# Patient Record
Sex: FEMALE | Race: BLACK OR AFRICAN AMERICAN | Employment: UNEMPLOYED | URBAN - METROPOLITAN AREA
[De-identification: names, ages, dates, MRNs, and addresses within clinical notes are randomized per-mention and may not be internally consistent; named-entity substitution may affect disease eponyms.]

---

## 2017-05-28 ENCOUNTER — APPOINTMENT (OUTPATIENT)
Dept: GENERAL RADIOLOGY | Age: 62
End: 2017-05-28
Attending: PHYSICIAN ASSISTANT
Payer: SELF-PAY

## 2017-05-28 ENCOUNTER — HOSPITAL ENCOUNTER (EMERGENCY)
Age: 62
Discharge: HOME | End: 2017-05-28
Attending: EMERGENCY MEDICINE
Payer: SELF-PAY

## 2017-05-28 VITALS
WEIGHT: 247 LBS | HEIGHT: 63 IN | HEART RATE: 98 BPM | RESPIRATION RATE: 20 BRPM | TEMPERATURE: 97.8 F | SYSTOLIC BLOOD PRESSURE: 99 MMHG | DIASTOLIC BLOOD PRESSURE: 46 MMHG | OXYGEN SATURATION: 99 % | BODY MASS INDEX: 43.77 KG/M2

## 2017-05-28 DIAGNOSIS — J44.1 ACUTE EXACERBATION OF CHRONIC OBSTRUCTIVE PULMONARY DISEASE (COPD) (HCC): Primary | ICD-10-CM

## 2017-05-28 LAB
ALBUMIN SERPL BCP-MCNC: 3.6 G/DL (ref 3.5–5)
ALBUMIN/GLOB SERPL: 0.9 {RATIO} (ref 1.1–2.2)
ALP SERPL-CCNC: 70 U/L (ref 45–117)
ALT SERPL-CCNC: 20 U/L (ref 12–78)
ANION GAP BLD CALC-SCNC: 9 MMOL/L (ref 5–15)
AST SERPL W P-5'-P-CCNC: 21 U/L (ref 15–37)
BASOPHILS # BLD AUTO: 0.1 K/UL (ref 0–0.1)
BASOPHILS # BLD: 1 % (ref 0–1)
BILIRUB SERPL-MCNC: 0.3 MG/DL (ref 0.2–1)
BNP SERPL-MCNC: 213 PG/ML (ref 0–125)
BUN SERPL-MCNC: 25 MG/DL (ref 6–20)
BUN/CREAT SERPL: 18 (ref 12–20)
CALCIUM SERPL-MCNC: 9 MG/DL (ref 8.5–10.1)
CHLORIDE SERPL-SCNC: 104 MMOL/L (ref 97–108)
CK MB CFR SERPL CALC: 0.3 % (ref 0–2.5)
CK MB SERPL-MCNC: 1.1 NG/ML (ref 5–25)
CK SERPL-CCNC: 361 U/L (ref 26–192)
CO2 SERPL-SCNC: 29 MMOL/L (ref 21–32)
CREAT SERPL-MCNC: 1.4 MG/DL (ref 0.55–1.02)
EOSINOPHIL # BLD: 0.3 K/UL (ref 0–0.4)
EOSINOPHIL NFR BLD: 3 % (ref 0–7)
ERYTHROCYTE [DISTWIDTH] IN BLOOD BY AUTOMATED COUNT: 13.1 % (ref 11.5–14.5)
GLOBULIN SER CALC-MCNC: 3.8 G/DL (ref 2–4)
GLUCOSE SERPL-MCNC: 84 MG/DL (ref 65–100)
HCT VFR BLD AUTO: 37.7 % (ref 35–47)
HGB BLD-MCNC: 11.9 G/DL (ref 11.5–16)
LYMPHOCYTES # BLD AUTO: 21 % (ref 12–49)
LYMPHOCYTES # BLD: 2 K/UL (ref 0.8–3.5)
MCH RBC QN AUTO: 28.4 PG (ref 26–34)
MCHC RBC AUTO-ENTMCNC: 31.6 G/DL (ref 30–36.5)
MCV RBC AUTO: 90 FL (ref 80–99)
MONOCYTES # BLD: 0.5 K/UL (ref 0–1)
MONOCYTES NFR BLD AUTO: 6 % (ref 5–13)
NEUTS SEG # BLD: 6.7 K/UL (ref 1.8–8)
NEUTS SEG NFR BLD AUTO: 69 % (ref 32–75)
PLATELET # BLD AUTO: 350 K/UL (ref 150–400)
POTASSIUM SERPL-SCNC: 3.4 MMOL/L (ref 3.5–5.1)
PROT SERPL-MCNC: 7.4 G/DL (ref 6.4–8.2)
RBC # BLD AUTO: 4.19 M/UL (ref 3.8–5.2)
SODIUM SERPL-SCNC: 142 MMOL/L (ref 136–145)
TROPONIN I SERPL-MCNC: <0.04 NG/ML
WBC # BLD AUTO: 9.5 K/UL (ref 3.6–11)

## 2017-05-28 PROCEDURE — 71020 XR CHEST PA LAT: CPT

## 2017-05-28 PROCEDURE — 74011000250 HC RX REV CODE- 250: Performed by: PHYSICIAN ASSISTANT

## 2017-05-28 PROCEDURE — 74011250636 HC RX REV CODE- 250/636: Performed by: PHYSICIAN ASSISTANT

## 2017-05-28 PROCEDURE — 85025 COMPLETE CBC W/AUTO DIFF WBC: CPT | Performed by: PHYSICIAN ASSISTANT

## 2017-05-28 PROCEDURE — 77030029684 HC NEB SM VOL KT MONA -A

## 2017-05-28 PROCEDURE — 83880 ASSAY OF NATRIURETIC PEPTIDE: CPT | Performed by: PHYSICIAN ASSISTANT

## 2017-05-28 PROCEDURE — 74011250637 HC RX REV CODE- 250/637: Performed by: PHYSICIAN ASSISTANT

## 2017-05-28 PROCEDURE — 36415 COLL VENOUS BLD VENIPUNCTURE: CPT | Performed by: PHYSICIAN ASSISTANT

## 2017-05-28 PROCEDURE — 99285 EMERGENCY DEPT VISIT HI MDM: CPT

## 2017-05-28 PROCEDURE — 94640 AIRWAY INHALATION TREATMENT: CPT

## 2017-05-28 PROCEDURE — 82550 ASSAY OF CK (CPK): CPT | Performed by: PHYSICIAN ASSISTANT

## 2017-05-28 PROCEDURE — 80053 COMPREHEN METABOLIC PANEL: CPT | Performed by: PHYSICIAN ASSISTANT

## 2017-05-28 PROCEDURE — 96374 THER/PROPH/DIAG INJ IV PUSH: CPT

## 2017-05-28 PROCEDURE — 84484 ASSAY OF TROPONIN QUANT: CPT | Performed by: PHYSICIAN ASSISTANT

## 2017-05-28 PROCEDURE — 93005 ELECTROCARDIOGRAM TRACING: CPT

## 2017-05-28 RX ORDER — SODIUM CHLORIDE 0.9 % (FLUSH) 0.9 %
5-10 SYRINGE (ML) INJECTION AS NEEDED
Status: DISCONTINUED | OUTPATIENT
Start: 2017-05-28 | End: 2017-05-29 | Stop reason: HOSPADM

## 2017-05-28 RX ORDER — IPRATROPIUM BROMIDE AND ALBUTEROL SULFATE 2.5; .5 MG/3ML; MG/3ML
3 SOLUTION RESPIRATORY (INHALATION)
Status: COMPLETED | OUTPATIENT
Start: 2017-05-28 | End: 2017-05-28

## 2017-05-28 RX ORDER — DEXAMETHASONE SODIUM PHOSPHATE 100 MG/10ML
10 INJECTION INTRAMUSCULAR; INTRAVENOUS ONCE
Status: COMPLETED | OUTPATIENT
Start: 2017-05-28 | End: 2017-05-28

## 2017-05-28 RX ORDER — ALBUTEROL SULFATE 90 UG/1
2 AEROSOL, METERED RESPIRATORY (INHALATION)
Qty: 1 INHALER | Refills: 0 | Status: SHIPPED | OUTPATIENT
Start: 2017-05-28

## 2017-05-28 RX ORDER — SODIUM CHLORIDE 0.9 % (FLUSH) 0.9 %
5-10 SYRINGE (ML) INJECTION EVERY 8 HOURS
Status: DISCONTINUED | OUTPATIENT
Start: 2017-05-28 | End: 2017-05-29 | Stop reason: HOSPADM

## 2017-05-28 RX ORDER — POTASSIUM CHLORIDE 750 MG/1
20 TABLET, FILM COATED, EXTENDED RELEASE ORAL
Status: COMPLETED | OUTPATIENT
Start: 2017-05-28 | End: 2017-05-28

## 2017-05-28 RX ORDER — PREDNISONE 10 MG/1
TABLET ORAL
Qty: 21 TAB | Refills: 0 | Status: SHIPPED | OUTPATIENT
Start: 2017-05-28

## 2017-05-28 RX ADMIN — Medication 10 ML: at 18:44

## 2017-05-28 RX ADMIN — POTASSIUM CHLORIDE 20 MEQ: 750 TABLET, FILM COATED, EXTENDED RELEASE ORAL at 20:02

## 2017-05-28 RX ADMIN — IPRATROPIUM BROMIDE AND ALBUTEROL SULFATE 3 ML: .5; 3 SOLUTION RESPIRATORY (INHALATION) at 18:36

## 2017-05-28 RX ADMIN — IPRATROPIUM BROMIDE AND ALBUTEROL SULFATE 3 ML: .5; 3 SOLUTION RESPIRATORY (INHALATION) at 19:43

## 2017-05-28 RX ADMIN — DEXAMETHASONE SODIUM PHOSPHATE 10 MG: 10 INJECTION INTRAMUSCULAR; INTRAVENOUS at 18:41

## 2017-05-28 NOTE — ED NOTES
Pt resting in bed w/ family at bedside, pt appears to be in no distress, will continue to monitor pt.

## 2017-05-28 NOTE — ED NOTES
Per pt reports shortness of breath that started at 2:30 am today, sitting up makes the pain better, hx of COPD, EMS placed pt on 2 L NC, pt was mid 90's on RA, denies chest pain. Intermittent non productive cough x 1 week, denies abdominal pain, N/V/D, fever, chills. Anterior right lower leg bruising due to falling a week ago, pt reports \"I was walking up stairs. \"

## 2017-05-28 NOTE — ED PROVIDER NOTES
HPI Comments: Nakia Bryan is a 62 y.o. female w/ hx significant for COPD, HTN, panic attacks, and arthritis who presents via EMS on 2L oxygen by nasal canula to the ED c/o shortness of breath since 2:30 AM this morning. States she was lying in bed when the SOB hit her suddenly. States it has been intermittent throughout the day. Pt additionally endorses a dry cough x 1 week. Pt also states she tripped walking up the stairs about a week ago and hitting the anterior aspect of her Rt leg. Pt endorses she had a visit with her PCP last week and was told everything was normal. Pt states she has a history of dyspnea on exertion as well as orthopnea. Pt specifically denies fever, chills, nasal congestion, ear pain, sore throat, headache, chest pain, n/v, abd pain, diarrhea, constipation, urinary sxs. PCP: Dianne Courtney MD    There are no other complaints, changes or physical findings at this time. Patient is a 62 y.o. female presenting with shortness of breath. The history is provided by the patient. Shortness of Breath   This is a new problem. The average episode lasts 14 hours. The problem occurs intermittently. The current episode started 12 to 24 hours ago. The problem has not changed since onset. Associated symptoms include cough and leg pain. Pertinent negatives include no fever, no headaches, no coryza, no rhinorrhea, no sore throat, no swollen glands, no ear pain, no neck pain, no sputum production, no hemoptysis, no wheezing, no PND, no orthopnea, no chest pain, no syncope, no vomiting, no abdominal pain, no rash, no leg swelling and no claudication. Risk factors include a recent leg injury. She has tried ipratropium inhalers and beta-agonist inhalers for the symptoms. She has had no prior hospitalizations. She has had prior ED visits. She has had no prior ICU admissions. Associated medical issues include COPD. Associated medical issues do not include chronic lung disease.         Past Medical History:   Diagnosis Date    Arthritis     Chronic obstructive pulmonary disease ([facility] [address]     Hypertension     Psychiatric disorder     Panic attacks       Past Surgical History:   Procedure Laterality Date    HX GYN               Family History:   Problem Relation Age of Onset    Hypertension Mother    [employer] Ele[employer]ated [employer] Mother        Social History     Social History    Marital status: SINGLE     Spouse name: N/A    Number of children: N/A    Years of education: N/A     Occupational History    Not on file. Social History Main Topics    Smoking status: Current Every Day Smoker     Packs/day: 0.50     Years: 25.00    Smokeless tobacco: Never Used    Alcohol use No      Comment: special occasions    Drug use: No    Sexual activity: Not Currently     Other Topics Concern    Not on file     Social History Narrative         ALLERGIES: Review of patient's allergies indicates no known allergies. Review of Systems   Constitutional: Negative for activity change, chills, diaphoresis, fatigue and fever. HENT: Negative for congestion, ear pain, facial swelling, postnasal drip, rhinorrhea, sinus pressure, sore throat and voice change. Respiratory: Positive for cough and shortness of breath. Negative for hemoptysis, sputum production, chest tightness and wheezing. Cardiovascular: Negative for chest pain, palpitations, orthopnea, claudication, leg swelling, syncope and PND. Gastrointestinal: Negative for abdominal pain, constipation, diarrhea, nausea and vomiting. Genitourinary: Negative for dysuria, frequency and urgency. Musculoskeletal: Negative for back pain, gait problem, joint swelling and neck pain. Skin: Negative for color change, pallor, rash and wound. Neurological: Negative for dizziness, weakness and headaches. Psychiatric/Behavioral: Negative.         Vitals:    17 1755   BP: (!) 122/93   Pulse: 98   Resp: 20   Temp: 97.8 °F (36.6 °C)   SpO2: 100%   Weight: 112 kg (247 lb)   Height: 5' 3\" (1.6 m)            Physical Exam   Constitutional: She is oriented to person, place, and time. She appears well-developed and well-nourished. No distress. Pt laughing and talking in full sentences in room. HENT:   Head: Normocephalic and atraumatic. Right Ear: Hearing and external ear normal.   Left Ear: Hearing and external ear normal.   Nose: Nose normal.   Mouth/Throat: No oropharyngeal exudate. Eyes: Conjunctivae and EOM are normal. Pupils are equal, round, and reactive to light. Neck: Normal range of motion. Cardiovascular: Normal rate, regular rhythm, normal heart sounds and intact distal pulses. Exam reveals no gallop and no friction rub. No murmur heard. Pulmonary/Chest: Effort normal. No accessory muscle usage. No respiratory distress. She has no decreased breath sounds. She has no wheezes. She has no rhonchi. She has no rales. She exhibits no tenderness. Distant lung sounds. Abdominal: Soft. There is no tenderness. Musculoskeletal: Normal range of motion. Neurological: She is alert and oriented to person, place, and time. No cranial nerve deficit. Skin: Skin is warm and dry. Bruising (mild bruising to anterior Rt lower leg. Neg swelling, erthema, streaking, posterior TTP.) noted. She is not diaphoretic. Psychiatric: She has a normal mood and affect. Her behavior is normal. Judgment and thought content normal.   Nursing note and vitals reviewed.        MDM  Number of Diagnoses or Management Options  Acute exacerbation of chronic obstructive pulmonary disease (COPD) ([facility] [address]):   Diagnosis management comments: DDx: CHF, COPD exacerbation, pneumonia, PE    LABORATORY TESTS:  Recent Results (from the past 12 hour(s))  -EKG, 12 LEAD, INITIAL  Collection Time: 05/28/17  6:19 PM       Result                                            Value                         Ref Range                       Ventricular Rate                                  84 BPM                             Atrial Rate                                       84                            BPM                             P-R Interval                                      132                           ms                              QRS Duration                                      92                            ms                              Q-T Interval                                      404                           ms                              QTC Calculation (Bezet)                           477                           ms                              Calculated P Axis                                 57                            degrees                         Calculated R Axis                                 18                            degrees                         Calculated T Axis                                 36                            degrees                         Diagnosis                                                                                                   Normal sinus rhythm Normal ECG When compared with ECG of 22-DEC-2014 14:30, No significant change was found   -METABOLIC PANEL, COMPREHENSIVE  Collection Time: 05/28/17  6:43 PM       Result                                            Value                         Ref Range                       Sodium                                            142                           136 - 145 mmol/L                Potassium                                         3.4 (L)                       3.5 - 5.1 mmol/L                Chloride                                          104                           97 - 108 mmol/L                 CO2                                               29                            21 - 32 mmol/L                  Anion gap                                         9                             5 - 15 mmol/L                   Glucose 84                            65 - 100 mg/dL                  BUN                                               25 (H)                        6 - 20 MG/DL                    Creatinine                                        1.40 (H)                      0.55 - 1.02 MG/DL               BUN/Creatinine ratio                              18                            12 - 20                         GFR est AA                                        46 (L)                        >60 ml/min/1.73m2               GFR est non-AA                                    38 (L)                        >60 ml/min/1.73m2               Calcium                                           9.0                           8.5 - 10.1 MG/DL                Bilirubin, total                                  0.3                           0.2 - 1.0 MG/DL                 ALT (SGPT)                                        20                            12 - 78 U/L                     AST (SGOT)                                        21                            15 - 37 U/L                     Alk. phosphatase                                  70                            45 - 117 U/L                    Protein, total                                    7.4                           6.4 - 8.2 g/dL                  Albumin                                           3.6                           3.5 - 5.0 g/dL                  Globulin                                          3.8                           2.0 - 4.0 g/dL                  A-G Ratio                                         0.9 (L)                       1.1 - 2.2                  -CBC WITH AUTOMATED DIFF  Collection Time: 05/28/17  6:43 PM       Result                                            Value                         Ref Range                       WBC                                               9.5                           3.6 - 11.0 K/uL                 RBC 4.19                          3.80 - 5.20 M/uL                HGB                                               11.9                          11.5 - 16.0 g/dL                HCT                                               37.7                          35.0 - 47.0 %                   MCV                                               90.0                          80.0 - 99.0 FL                  MCH                                               28.4                          26.0 - 34.0 PG                  MCHC                                              31.6                          30.0 - 36.5 g/dL                RDW                                               13.1                          11.5 - 14.5 %                   PLATELET                                          350                           150 - 400 K/uL                  NEUTROPHILS                                       69                            32 - 75 %                       LYMPHOCYTES                                       21                            12 - 49 %                       MONOCYTES                                         6                             5 - 13 %                        EOSINOPHILS                                       3                             0 - 7 %                         BASOPHILS                                         1                             0 - 1 %                         ABS. NEUTROPHILS                                  6.7                           1.8 - 8.0 K/UL                  ABS. LYMPHOCYTES                                  2.0                           0.8 - 3.5 K/UL                  ABS. MONOCYTES                                    0.5                           0.0 - 1.0 K/UL                  ABS. EOSINOPHILS                                  0.3                           0.0 - 0.4 K/UL                  ABS.  BASOPHILS                                    0.1 0.0 - 0.1 K/UL             -TROPONIN I  Collection Time: 05/28/17  6:43 PM       Result                                            Value                         Ref Range                       Troponin-I, Qt.                                   <0.04                         <0.05 ng/mL                -CK W/ CKMB & INDEX  Collection Time: 05/28/17  6:43 PM       Result                                            Value                         Ref Range                       CK                                                361 (H)                       26 - 192 U/L                    CK - MB                                           1.1                           <3.6 NG/ML                      CK-MB Index                                       0.3                           0.0 - 2.5                  -PRO-BNP  Collection Time: 05/28/17  6:43 PM       Result                                            Value                         Ref Range                       NT pro-BNP                                        213 (H)                       0 - 125 PG/ML                IMAGING RESULTS:  XR CHEST PA LAT   Final Result   FINDINGS: PA and lateral view of the chest.   .  Lines/tubes/surgical: None. Heart/mediastinum: Unremarkable. Lungs/pleura: No focal consolidation or mass. No visualized pleural effusion or  pneumothorax. Additional Comments: Osteopenia and kyphosis with degenerative changes in the  spine. .  IMPRESSION  IMPRESSION:  1. No radiographic evidence of acute cardiopulmonary disease.     MEDICATIONS GIVEN:  Medications  sodium chloride (NS) flush 5-10 mL (not administered)  sodium chloride (NS) flush 5-10 mL (10 mL IntraVENous Given 5/28/17 1844)  albuterol-ipratropium (DUO-NEB) 2.5 MG-0.5 MG/3 ML (3 mL Nebulization Given 5/28/17 1836)  dexamethasone (DECADRON) injection 10 mg (10 mg IntraVENous Given 5/28/17 1841)  albuterol-ipratropium (DUO-NEB) 2.5 MG-0.5 MG/3 ML (3 mL Nebulization Given 5/28/17 1943)  potassium chloride SR (KLOR-CON 10) tablet 20 mEq (20 mEq Oral Given 5/28/17 2002)    IMPRESSION:  Acute exacerbation of chronic obstructive pulmonary disease (COPD) ([address]  (primary encounter diagnosis)    PLAN:  1. Current Discharge Medication List    START taking these medications    predniSONE (STERAPRED DS) 10 mg dose pack  See administration instruction per 10mg dose pack  Qty: 21 Tab Refills: 0      CONTINUE these medications which have CHANGED    albuterol (PROVENTIL HFA, VENTOLIN HFA, PROAIR HFA) 90 mcg/actuation inhaler   Take 2 Puffs by inhalation every four (4) hours as needed for Wheezing or Shortness of Breath. Indications: Chronic Obstructive Pulmonary Disease  Qty: 1 Inhaler Refills: 0      CONTINUE these medications which have NOT CHANGED    tiotropium (SPIRIVA WITH HANDIHALER) 18 mcg inhalation capsule  Take 1 Cap by inhalation daily. atorvastatin (LIPITOR) 20 mg tablet  Take 1 Tab by mouth nightly. Qty: 30 Tab Refills: 2    omeprazole (PRILOSEC) 20 mg capsule  Take 1 Cap by mouth as needed. Qty: 30 Cap Refills: 4  Associated Diagnoses:Gastroesophageal reflux disease without esophagitis    lisinopril-hydrochlorothiazide (PRINZIDE, ZESTORETIC) 10-12.5 mg per tablet  Take 1 Tab by mouth daily. Qty: 90 Tab Refills: 1  Associated Diagnoses:Essential hypertension        2. Follow-up Information     Follow up With Details Comments Contact Info    Not On File [facility] Schedule an appointment as soon as possible for a visit   in 1 week As needed, If symptoms worsen Not On File (62) Patient has a PCP   but that physician is not listed in [facility].         Return to ED if worse                  Amount and/or Complexity of Data Reviewed  Clinical lab tests: reviewed and ordered  Tests in the radiology section of CPT®: ordered and reviewed  Tests in the medicine section of CPT®: reviewed and ordered  Decide to obtain previous medical records or to obtain history from someone other than the patient: yes  Discuss the patient with other providers: yes (Attending, Dr. Suzi Nichols, updated on pt's hx, sxs, PE, vitals, and pending labs. He is in agreement with care plan. Attending, Dr. Luis Rome, was updated on pt's hx, sxs, vitals, PE, lab results and is agreement with care plan.)    Patient Progress  Patient progress: improved    ED Course       Procedures    EKG interpretation: (Preliminary)  Rhythm: normal sinus rhythm; and regular . Rate (approx.): 84bpm; Axis: normal; WI interval: normal; QRS interval: normal ; ST/T wave: normal; Other findings: unchanged from previous ekg.    7:29 PM  Pt doing well and resting comfortably. Endorses breathing has improved but still has some wheezing. Requesting another breathing treatment. 8:06 PM  Pt endorses feeling much better after receiving second duo-neb tx.     8:06 PM  I have discussed with patient their diagnosis, treatment, and follow up plan. The patient agrees to follow up as outlined in discharge paperwork and also to return to the ED with any worsening.  Taj Dean PA-C

## 2017-05-28 NOTE — ED NOTES
Bedside and Verbal shift change report given to CHRIS Lemus (oncoming nurse) by Yadira Klein (offgoing nurse). Report included the following information SBAR.

## 2017-05-29 NOTE — ED NOTES
Patient (s) was given copy of dc instructions and two paper script(s) and no electronic scripts. Patient (s) has verbalized understanding of instructions and script (s). Patient given a current medication reconciliation form and verbalized understanding of their medications. Patient (s) has verbalized understanding of the importance of discussing medications with  his or her physician or clinic they will be following up with. Patient alert and oriented and in no acute distress. Patient offered wheelchair from treatment area to hospital entrance, patient declined wheelchair. Patient left ED with family.

## 2017-05-29 NOTE — DISCHARGE INSTRUCTIONS
COPD Exacerbation Plan: Care Instructions  Your Care Instructions  If you have chronic obstructive pulmonary disease (COPD), your usual shortness of breath could suddenly get worse. You may start coughing more and have more mucus. This flare-up is called a COPD exacerbation (say \"ek-[id number]-ge-[id number]-dalton\"). A lung infection or air pollution could set off an exacerbation. Sometimes it can happen after a quick change in temperature or being around chemicals. Work with your doctor to make a plan for dealing with an exacerbation. You can better manage it if you plan ahead. Follow-up care is a key part of your treatment and safety. Be sure to make and go to all appointments, and call your doctor if you are having problems. It's also a good idea to know your test results and keep a list of the medicines you take. How can you care for yourself at home? During an exacerbation  · Do not panic if you start to have one. Quick treatment at home may help you prevent serious breathing problems. If you have a COPD exacerbation plan that you developed with your doctor, follow it. · Take your medicines exactly as your doctor tells you. ¨ Use your inhaler as directed by your doctor. If your symptoms do not get better after you use your medicine, have someone take you to the emergency room. Call an ambulance if necessary. ¨ With inhaled medicines, a spacer or a nebulizer may help you get more medicine to your lungs. Ask your doctor or pharmacist how to use them properly. Practice using the spacer in front of a mirror before you have an exacerbation. This may help you get the medicine into your lungs quickly. ¨ If your doctor has given you steroid pills, take them as directed. ¨ Your doctor may have given you a prescription for antibiotics, which you can fill if you need it. ¨ Talk to your doctor if you have any problems with your medicine. And call your doctor if you have to use your antibiotic or steroid pills.   Preventing an exacerbation  · Do not smoke. This is the most important step you can take to prevent more damage to your lungs and prevent problems. If you already smoke, it is never too late to stop. If you need help quitting, talk to your doctor about stop-smoking programs and medicines. These can increase your chances of quitting for good. · Take your daily medicines as prescribed. · Avoid colds and flu. ¨ Get a pneumococcal vaccine. ¨ Get a flu vaccine each year, as soon as it is available. Ask those you live or work with to do the same, so they will not get the flu and infect you. ¨ Try to stay away from people with colds or the flu. ¨ Wash your hands often. · Avoid secondhand smoke; air pollution; cold, dry air; hot, humid air; and high altitudes. Stay at home with your windows closed when air pollution is bad. · Learn breathing techniques for COPD, such as breathing through pursed lips. These techniques can help you breathe easier during an exacerbation. When should you call for help? Call 911 anytime you think you may need emergency care. For example, call if:  · You have severe trouble breathing. · You have severe chest pain. Call your doctor now or seek immediate medical care if:  · You have new or worse shortness of breath. · You develop new chest pain. · You are coughing more deeply or more often, especially if you notice more mucus or a change in the color of your mucus. · You cough up blood. · You have new or increased swelling in your legs or belly. · You have a fever. Watch closely for changes in your health, and be sure to contact your doctor if:  · You need to use your antibiotic or steroid pills. · Your symptoms are getting worse. Where can you learn more? Go to http://pavan-traci.info/. Enter N111 in the search box to learn more about \"COPD Exacerbation Plan: Care Instructions. \"  Current as of: July 21, 2016  Content Version: 11.2  © 9968-4737 [employer]. Care instructions adapted under license by [employer] (which disclaims liability or warranty for this information). If you have questions about a medical condition or this instruction, always ask your healthcare professional. [address] any warranty or liability for your use of this information.

## 2017-06-07 LAB
ATRIAL RATE: 84 BPM
CALCULATED P AXIS, ECG09: 57 DEGREES
CALCULATED R AXIS, ECG10: 18 DEGREES
CALCULATED T AXIS, ECG11: 36 DEGREES
DIAGNOSIS, 93000: NORMAL
P-R INTERVAL, ECG05: 132 MS
Q-T INTERVAL, ECG07: 404 MS
QRS DURATION, ECG06: 92 MS
QTC CALCULATION (BEZET), ECG08: 477 MS
VENTRICULAR RATE, ECG03: 84 BPM